# Patient Record
Sex: MALE | Race: WHITE | NOT HISPANIC OR LATINO | Employment: UNEMPLOYED | URBAN - METROPOLITAN AREA
[De-identification: names, ages, dates, MRNs, and addresses within clinical notes are randomized per-mention and may not be internally consistent; named-entity substitution may affect disease eponyms.]

---

## 2020-04-07 ENCOUNTER — HOSPITAL ENCOUNTER (EMERGENCY)
Facility: MEDICAL CENTER | Age: 50
End: 2020-04-09
Attending: EMERGENCY MEDICINE

## 2020-04-07 DIAGNOSIS — F22 PARANOIA (HCC): ICD-10-CM

## 2020-04-07 DIAGNOSIS — F22 DELUSIONS (HCC): ICD-10-CM

## 2020-04-07 LAB
ALBUMIN SERPL BCP-MCNC: 4.9 G/DL (ref 3.2–4.9)
ALBUMIN/GLOB SERPL: 1.5 G/DL
ALP SERPL-CCNC: 60 U/L (ref 30–99)
ALT SERPL-CCNC: 52 U/L (ref 2–50)
ANION GAP SERPL CALC-SCNC: 17 MMOL/L (ref 7–16)
AST SERPL-CCNC: 102 U/L (ref 12–45)
BASOPHILS # BLD AUTO: 0.5 % (ref 0–1.8)
BASOPHILS # BLD: 0.06 K/UL (ref 0–0.12)
BILIRUB SERPL-MCNC: 1 MG/DL (ref 0.1–1.5)
BUN SERPL-MCNC: 14 MG/DL (ref 8–22)
CALCIUM SERPL-MCNC: 9.2 MG/DL (ref 8.5–10.5)
CHLORIDE SERPL-SCNC: 97 MMOL/L (ref 96–112)
CO2 SERPL-SCNC: 22 MMOL/L (ref 20–33)
CREAT SERPL-MCNC: 1.06 MG/DL (ref 0.5–1.4)
EOSINOPHIL # BLD AUTO: 0.01 K/UL (ref 0–0.51)
EOSINOPHIL NFR BLD: 0.1 % (ref 0–6.9)
ERYTHROCYTE [DISTWIDTH] IN BLOOD BY AUTOMATED COUNT: 43.4 FL (ref 35.9–50)
ETHANOL BLD-MCNC: <10.1 MG/DL (ref 0–10.1)
GLOBULIN SER CALC-MCNC: 3.2 G/DL (ref 1.9–3.5)
GLUCOSE SERPL-MCNC: 122 MG/DL (ref 65–99)
HCT VFR BLD AUTO: 47.1 % (ref 42–52)
HGB BLD-MCNC: 16.7 G/DL (ref 14–18)
IMM GRANULOCYTES # BLD AUTO: 0.02 K/UL (ref 0–0.11)
IMM GRANULOCYTES NFR BLD AUTO: 0.2 % (ref 0–0.9)
LYMPHOCYTES # BLD AUTO: 2.13 K/UL (ref 1–4.8)
LYMPHOCYTES NFR BLD: 17.1 % (ref 22–41)
MCH RBC QN AUTO: 32.4 PG (ref 27–33)
MCHC RBC AUTO-ENTMCNC: 35.5 G/DL (ref 33.7–35.3)
MCV RBC AUTO: 91.3 FL (ref 81.4–97.8)
MONOCYTES # BLD AUTO: 1.16 K/UL (ref 0–0.85)
MONOCYTES NFR BLD AUTO: 9.3 % (ref 0–13.4)
NEUTROPHILS # BLD AUTO: 9.07 K/UL (ref 1.82–7.42)
NEUTROPHILS NFR BLD: 72.8 % (ref 44–72)
NRBC # BLD AUTO: 0 K/UL
NRBC BLD-RTO: 0 /100 WBC
PLATELET # BLD AUTO: 278 K/UL (ref 164–446)
PMV BLD AUTO: 9.1 FL (ref 9–12.9)
POC BREATHALIZER: 0 PERCENT (ref 0–0.01)
POTASSIUM SERPL-SCNC: 3 MMOL/L (ref 3.6–5.5)
PROT SERPL-MCNC: 8.1 G/DL (ref 6–8.2)
RBC # BLD AUTO: 5.16 M/UL (ref 4.7–6.1)
SODIUM SERPL-SCNC: 136 MMOL/L (ref 135–145)
WBC # BLD AUTO: 12.5 K/UL (ref 4.8–10.8)

## 2020-04-07 PROCEDURE — 80053 COMPREHEN METABOLIC PANEL: CPT

## 2020-04-07 PROCEDURE — 700111 HCHG RX REV CODE 636 W/ 250 OVERRIDE (IP): Performed by: EMERGENCY MEDICINE

## 2020-04-07 PROCEDURE — 700102 HCHG RX REV CODE 250 W/ 637 OVERRIDE(OP): Performed by: EMERGENCY MEDICINE

## 2020-04-07 PROCEDURE — 302970 POC BREATHALIZER: Performed by: EMERGENCY MEDICINE

## 2020-04-07 PROCEDURE — 80307 DRUG TEST PRSMV CHEM ANLYZR: CPT

## 2020-04-07 PROCEDURE — 99285 EMERGENCY DEPT VISIT HI MDM: CPT

## 2020-04-07 PROCEDURE — 85025 COMPLETE CBC W/AUTO DIFF WBC: CPT

## 2020-04-07 PROCEDURE — 96372 THER/PROPH/DIAG INJ SC/IM: CPT

## 2020-04-07 PROCEDURE — A9270 NON-COVERED ITEM OR SERVICE: HCPCS | Performed by: EMERGENCY MEDICINE

## 2020-04-07 RX ORDER — ZIPRASIDONE HYDROCHLORIDE 40 MG/1
40 CAPSULE ORAL ONCE
Status: DISCONTINUED | OUTPATIENT
Start: 2020-04-07 | End: 2020-04-08

## 2020-04-07 RX ORDER — DIPHENHYDRAMINE HYDROCHLORIDE 50 MG/ML
50 INJECTION INTRAMUSCULAR; INTRAVENOUS ONCE
Status: DISCONTINUED | OUTPATIENT
Start: 2020-04-07 | End: 2020-04-07

## 2020-04-07 RX ORDER — DIPHENHYDRAMINE HYDROCHLORIDE 50 MG/ML
50 INJECTION INTRAMUSCULAR; INTRAVENOUS ONCE
Status: COMPLETED | OUTPATIENT
Start: 2020-04-07 | End: 2020-04-07

## 2020-04-07 RX ORDER — TRAZODONE HYDROCHLORIDE 50 MG/1
50 TABLET ORAL ONCE
Status: COMPLETED | OUTPATIENT
Start: 2020-04-07 | End: 2020-04-07

## 2020-04-07 RX ORDER — HALOPERIDOL 5 MG/ML
5 INJECTION INTRAMUSCULAR ONCE
Status: COMPLETED | OUTPATIENT
Start: 2020-04-07 | End: 2020-04-07

## 2020-04-07 RX ORDER — POTASSIUM CHLORIDE 20 MEQ/1
40 TABLET, EXTENDED RELEASE ORAL ONCE
Status: COMPLETED | OUTPATIENT
Start: 2020-04-07 | End: 2020-04-08

## 2020-04-07 RX ORDER — LORAZEPAM 2 MG/ML
2 INJECTION INTRAMUSCULAR ONCE
Status: COMPLETED | OUTPATIENT
Start: 2020-04-07 | End: 2020-04-07

## 2020-04-07 RX ORDER — LORAZEPAM 2 MG/ML
2 INJECTION INTRAMUSCULAR ONCE
Status: DISCONTINUED | OUTPATIENT
Start: 2020-04-07 | End: 2020-04-07

## 2020-04-07 RX ORDER — LORAZEPAM 1 MG/1
1 TABLET ORAL ONCE
Status: DISCONTINUED | OUTPATIENT
Start: 2020-04-07 | End: 2020-04-08

## 2020-04-07 RX ADMIN — DIPHENHYDRAMINE HYDROCHLORIDE 50 MG: 50 INJECTION INTRAMUSCULAR; INTRAVENOUS at 21:38

## 2020-04-07 RX ADMIN — TRAZODONE HYDROCHLORIDE 50 MG: 50 TABLET ORAL at 20:04

## 2020-04-07 RX ADMIN — HALOPERIDOL LACTATE 5 MG: 5 INJECTION, SOLUTION INTRAMUSCULAR at 21:37

## 2020-04-07 RX ADMIN — LORAZEPAM 2 MG: 2 INJECTION INTRAMUSCULAR; INTRAVENOUS at 21:37

## 2020-04-08 LAB
AMPHET UR QL SCN: POSITIVE
BARBITURATES UR QL SCN: NEGATIVE
BENZODIAZ UR QL SCN: NEGATIVE
BZE UR QL SCN: NEGATIVE
CANNABINOIDS UR QL SCN: NEGATIVE
METHADONE UR QL SCN: NEGATIVE
OPIATES UR QL SCN: NEGATIVE
OXYCODONE UR QL SCN: NEGATIVE
PCP UR QL SCN: NEGATIVE
PROPOXYPH UR QL SCN: NEGATIVE

## 2020-04-08 PROCEDURE — 80307 DRUG TEST PRSMV CHEM ANLYZR: CPT

## 2020-04-08 PROCEDURE — 90791 PSYCH DIAGNOSTIC EVALUATION: CPT

## 2020-04-08 PROCEDURE — A9270 NON-COVERED ITEM OR SERVICE: HCPCS | Performed by: STUDENT IN AN ORGANIZED HEALTH CARE EDUCATION/TRAINING PROGRAM

## 2020-04-08 PROCEDURE — 90792 PSYCH DIAG EVAL W/MED SRVCS: CPT | Mod: GC | Performed by: PSYCHIATRY & NEUROLOGY

## 2020-04-08 PROCEDURE — 93005 ELECTROCARDIOGRAM TRACING: CPT | Performed by: PSYCHIATRY & NEUROLOGY

## 2020-04-08 PROCEDURE — A9270 NON-COVERED ITEM OR SERVICE: HCPCS | Performed by: EMERGENCY MEDICINE

## 2020-04-08 PROCEDURE — 700102 HCHG RX REV CODE 250 W/ 637 OVERRIDE(OP): Performed by: EMERGENCY MEDICINE

## 2020-04-08 PROCEDURE — 700102 HCHG RX REV CODE 250 W/ 637 OVERRIDE(OP): Performed by: STUDENT IN AN ORGANIZED HEALTH CARE EDUCATION/TRAINING PROGRAM

## 2020-04-08 RX ORDER — TRAZODONE HYDROCHLORIDE 50 MG/1
50 TABLET ORAL
Status: DISCONTINUED | OUTPATIENT
Start: 2020-04-08 | End: 2020-04-09 | Stop reason: HOSPADM

## 2020-04-08 RX ORDER — DIPHENHYDRAMINE HCL 25 MG
50 TABLET ORAL
Status: DISCONTINUED | OUTPATIENT
Start: 2020-04-08 | End: 2020-04-09 | Stop reason: HOSPADM

## 2020-04-08 RX ORDER — LORAZEPAM 2 MG/1
2 TABLET ORAL
Status: DISCONTINUED | OUTPATIENT
Start: 2020-04-08 | End: 2020-04-09 | Stop reason: HOSPADM

## 2020-04-08 RX ORDER — LORAZEPAM 2 MG/ML
2 INJECTION INTRAMUSCULAR
Status: DISCONTINUED | OUTPATIENT
Start: 2020-04-08 | End: 2020-04-09 | Stop reason: HOSPADM

## 2020-04-08 RX ORDER — HALOPERIDOL 5 MG/1
5 TABLET ORAL
Status: DISCONTINUED | OUTPATIENT
Start: 2020-04-08 | End: 2020-04-09 | Stop reason: HOSPADM

## 2020-04-08 RX ORDER — HALOPERIDOL 5 MG/ML
5 INJECTION INTRAMUSCULAR
Status: DISCONTINUED | OUTPATIENT
Start: 2020-04-08 | End: 2020-04-09 | Stop reason: HOSPADM

## 2020-04-08 RX ORDER — DIPHENHYDRAMINE HYDROCHLORIDE 50 MG/ML
50 INJECTION INTRAMUSCULAR; INTRAVENOUS
Status: DISCONTINUED | OUTPATIENT
Start: 2020-04-08 | End: 2020-04-09 | Stop reason: HOSPADM

## 2020-04-08 RX ORDER — RISPERIDONE 1 MG/1
0.5 TABLET ORAL 2 TIMES DAILY
Status: DISCONTINUED | OUTPATIENT
Start: 2020-04-08 | End: 2020-04-08

## 2020-04-08 RX ORDER — RISPERIDONE 1 MG/1
1 TABLET ORAL 2 TIMES DAILY
Status: DISCONTINUED | OUTPATIENT
Start: 2020-04-08 | End: 2020-04-09 | Stop reason: HOSPADM

## 2020-04-08 RX ADMIN — POTASSIUM CHLORIDE 40 MEQ: 1500 TABLET, EXTENDED RELEASE ORAL at 05:26

## 2020-04-08 RX ADMIN — TRAZODONE HYDROCHLORIDE 50 MG: 50 TABLET ORAL at 23:09

## 2020-04-08 NOTE — ED NOTES
Pt alert and oriented at this time.  He denies SI/HI.  He appears anxious and states he thinks someone is out to get him and is concerned about his safety. Pt asking for his door to be locked.  Pt continues to be on guard looking out of his door.  Pt medicated per MAR.  Safety maintained. Pt in close observation from nurses station.
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
## 2020-04-08 NOTE — ED NOTES
Urine specimen walked to lab.  Pt again standing at the door, no aggressive behavior noted at this time.

## 2020-04-08 NOTE — ED NOTES
Pt awake and orientated at this time.  Medicated per MAR.  Provided a urinal and instructed that we needed a sample.  No complaints at this time.

## 2020-04-08 NOTE — ED NOTES
Med rec updated and complete  Allergies reviewed  Per RN, pt just left longterm on 4/1/2020 reports that he was getting TRAZODONE 50MG, but did not get a prescription when he left longterm.  Pt reports no vitamins or OTC's.  Pt reports no antibiotics in the last 2 weeks  Pt is not sure what pharmacy to go too.

## 2020-04-08 NOTE — DISCHARGE PLANNING
Medical Social Work    Referral: Legal Hold    Intervention: Legal Hold Paperwork given to SW by Life Skills RN Ene Jennings    Legal Hold Initiated: Date: 04-07-20 Time: 1830    Patient’s Insurance Listed on Face Sheet: None    Referrals sent to: [facility]    This referral contains the following information:  1) Face sheet _x___  2) Page 1 and Page 2 of Legal Hold _x___  3) Alert Team Assessment/Psych Assessment ___x_  4) Head to toe physical exam ___x_  5) Urine Drug Screen __x__  6) Blood Alcohol __x__  7) Vital signs __x__  8) Pregnancy test when applicable __NA_  9) Medications list __x__    Plan: Patient will transfer to mental health facility once acceptance is obtained
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
## 2020-04-08 NOTE — ED NOTES
Pt continues to stand at door, anxious.  He starting hitting the door, refusing PO medication. ERP aware.  Unable to redirect.  Continues to have hallucinations, stating someone is out to get him and he feels unsafe.  Security at beside. Po medication changed to IM per ERP. Medication administered per MAR.  Pt redirected.

## 2020-04-08 NOTE — ED NOTES
Patient sleeping at this time. Equal chest rise and fall noted. Covered with a blanket.  Side rails remain up at this time

## 2020-04-08 NOTE — ED NOTES
Patient rounded on and visualized.  Respirations are even an unlabored. Patient repositions self as needed

## 2020-04-08 NOTE — DISCHARGE PLANNING
Medical Social Work    MSW received a copy of pt's legal hold from Alert Team.  Unable to send mental health referrals until pt is registered, Alert Team note is available and pt's UDS is resulted.

## 2020-04-08 NOTE — ED NOTES
Patient's home medications have been reviewed by the pharmacy team.     Past Medical History:   Diagnosis Date   • Depression    • Psychiatric disorder        Patient's Medications    No medications on file        A/P:  Patient denies taking any medications. None have been ordered at this time. Defer to psychiatry.      Colton Morrow, PharmD, BCPS

## 2020-04-08 NOTE — ED PROVIDER NOTES
ED Provider Note    Patient received in signout from Dr. Rosa at 6:20 AM    Briefly, pt is 49-year-old male presenting with psychotic behavior and delusions.  He has been medically cleared and is pending transfer to inpatient psychiatric facility      Patient has been comfortable and without complaint to my shift, will transfer care to Dr. Pennington
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
## 2020-04-08 NOTE — ED PROVIDER NOTES
ED Provider Note    Chief Complaint:   Delusional thoughts    HPI:  Mauricio Cruz is a 49 y.o. male who presents brought by [employer] police out of concern for psychotic behavior and delusions.  He was found wandering in the middle of the street.  He states that he was in the middle of the street to avoid a large group of Mexicans were trying to get him.  He states he was discharged from jail 3 days ago, and was treated with trazodone while in jail for depression.  He denies any other psychiatric history.  He reports that he was going home in a taxi, but when the taxi got there there was a model of [phone] people outside of his house, these of the same people who had been chasing him all day.  He continues to ask me, as well as nursing staff to put him in a room with a locked door.  This is despite us explaining that the hospital is entirely under lockdown at this time due to COVID-19, and that no one except patients are allowed into the emergency department.  He does have some rambling speech, does not appear to be responding to internal stimuli.  He reports no other significant past medical history, denies headaches, fevers, nausea vomiting, chest pain, or shortness of breath.    Review of Systems:  See HPI for pertinent positives and negatives. All other systems negative.    Past Medical History:   has a past medical history of Depression and Psychiatric disorder.    Social History:  Social History     Tobacco Use   • Smoking status: Never Smoker   • Smokeless tobacco: Never Used   Substance and Sexual Activity   • Alcohol use: Not Currently   • Drug use: Not Currently   • Sexual activity: Not on file       Surgical History:  patient denies any surgical history    Current Medications:  Home Medications    **Home medications have not yet been reviewed for this encounter**         Allergies:  No Known Allergies    Physical Exam:  Vital Signs: /72   Pulse 90   Temp 36.7 °C (98.1 °F) (Oral)   Resp 18   Ht 1.753  "m (5' 9\")   Wt 88.5 kg (195 lb)   SpO2 94%   BMI 28.80 kg/m²   Constitutional: Alert, no acute distress  HENT: Atraumatic, normocephalic  Eyes: Normal conjunctiva, no scleral icterus   Neck: Supple, normal range of motion  Cardiovascular: Extremities are warm and well perfused  Pulmonary: No respiratory distress, normal work of breathing  Abdomen: Non-distended  Skin: Warm, dry, no rashes or lesions  Musculoskeletal: No extremity deformity noted  Neurologic: Alert, awake, no slurred speech, no facial droop, normal gait, normal movements  Psychiatric: Overall appearance well-groomed with good hygiene, normal orientation, affect flat, thought process is tangential, hallucinations absent, speech is flat and steady, no pressured speech, poor insight, poor judgement, he is having active delusions and is concerned that the mob of people chasing him are going to break into the emergency department and harm him.    Medical records reviewed for continuity of care.  No prior medical records available for review    Labs:  Labs Reviewed   CBC WITH DIFFERENTIAL - Abnormal; Notable for the following components:       Result Value    WBC 12.5 (*)     MCHC 35.5 (*)     Neutrophils-Polys 72.80 (*)     Lymphocytes 17.10 (*)     Neutrophils (Absolute) 9.07 (*)     Monos (Absolute) 1.16 (*)     All other components within normal limits   COMP METABOLIC PANEL - Abnormal; Notable for the following components:    Potassium 3.0 (*)     Anion Gap 17.0 (*)     Glucose 122 (*)     AST(SGOT) 102 (*)     ALT(SGPT) 52 (*)     All other components within normal limits   POC BREATHALIZER - Normal   DIAGNOSTIC ALCOHOL   ESTIMATED GFR   URINE DRUG SCREEN       Radiology:  No orders to display        ED Medications Administered:  Medications   ziprasidone (GEODON) capsule 40 mg ( Oral Canceled Entry 4/7/20 2130)   LORazepam (ATIVAN) tablet 1 mg ( Oral Canceled Entry 4/7/20 2130)   potassium chloride SA (Kdur) tablet 40 mEq (has no administration " in time range)   traZODone (DESYREL) tablet 50 mg (50 mg Oral Given 4/7/20 2004)   haloperidol lactate (HALDOL) injection 5 mg (5 mg Intramuscular Given 4/7/20 2137)   LORazepam (ATIVAN) injection 2 mg (2 mg Intramuscular Given 4/7/20 2137)   diphenhydrAMINE (BENADRYL) injection 50 mg (50 mg Intramuscular Given 4/7/20 2138)       Differential diagnosis:  Psychotic break, substance-induced mood disorder, other psychiatric illness    MDM:  Patient presents brought by police on a legal hold for bizarre behavior and delusions.  He remains delusional in the emergency department, and is very concerned and angry mom is going to harm him in the emergency department.    Breathalyzer was 0.  He was given his home dose of trazodone, but did not have any improvement in his behavior.  Oral Ativan and Geodon was offered, however he refused.  His agitation escalated, we were not able to redirect him.  He then became violent punching the walls in the room.  He was given intramuscular Haldol, Ativan, and Benadryl as calming agents.  He was then resting comfortably after these medications take effect.    On laboratory evaluation White blood count is mildly elevated, patient has no fever, I am less concerned for infectious etiology, this is likely reactive due to his agitation.  CMP with no significant abnormalities, potassium is slightly low.  Oral potassium ordered.  Diagnostic alcohol level is negative.  Urine drug screen pending at this time.    Plan at this time is for alert team for psychiatry evaluation when the patient wakes and is cooperative.  Care will be turned over to oncoming physician.  Medical clearance and attestation completed.    This patient was not identified to have risk factors for COVID-19.  Personal protective equipment including N95 surgical respirator, goggles, and gloves were used during this encounter.     Disposition:  Anticipate transfer to behavioral health facility    Final Impression:  1. Delusions  (HCC)    2. Jimmy ([facility])        Electronically signed by: Bre Ruiz M.D., 4/7/2020 11:54 PM

## 2020-04-09 VITALS
DIASTOLIC BLOOD PRESSURE: 86 MMHG | OXYGEN SATURATION: 94 % | BODY MASS INDEX: 28.88 KG/M2 | SYSTOLIC BLOOD PRESSURE: 126 MMHG | HEIGHT: 69 IN | HEART RATE: 99 BPM | RESPIRATION RATE: 16 BRPM | TEMPERATURE: 97.9 F | WEIGHT: 195 LBS

## 2020-04-09 LAB — EKG IMPRESSION: NORMAL

## 2020-04-09 PROCEDURE — 99285 EMERGENCY DEPT VISIT HI MDM: CPT

## 2020-04-09 PROCEDURE — 99282 EMERGENCY DEPT VISIT SF MDM: CPT | Mod: GC | Performed by: PSYCHIATRY & NEUROLOGY

## 2020-04-09 PROCEDURE — 96372 THER/PROPH/DIAG INJ SC/IM: CPT

## 2020-04-09 RX ORDER — LORAZEPAM 2 MG/ML
2 INJECTION INTRAMUSCULAR ONCE
Status: DISCONTINUED | OUTPATIENT
Start: 2020-04-09 | End: 2020-04-09 | Stop reason: HOSPADM

## 2020-04-09 RX ORDER — TRAZODONE HYDROCHLORIDE 50 MG/1
50 TABLET ORAL
Qty: 15 TAB | Refills: 0 | Status: SHIPPED | OUTPATIENT
Start: 2020-04-09

## 2020-04-09 ASSESSMENT — ENCOUNTER SYMPTOMS
HALLUCINATIONS: 0
SPUTUM PRODUCTION: 0
SHORTNESS OF BREATH: 0
BLOOD IN STOOL: 0
PALPITATIONS: 0
SPEECH CHANGE: 0
DIAPHORESIS: 0
INSOMNIA: 0
NECK PAIN: 0
TREMORS: 0
MEMORY LOSS: 0
LOSS OF CONSCIOUSNESS: 0
EYE PAIN: 0
VOMITING: 0
CHILLS: 0
DOUBLE VISION: 0
HEARTBURN: 0
MYALGIAS: 0
BACK PAIN: 0
HEMOPTYSIS: 0
SENSORY CHANGE: 0
PHOTOPHOBIA: 0
NERVOUS/ANXIOUS: 0
COUGH: 0
DEPRESSION: 1
WEIGHT LOSS: 0
NAUSEA: 0
PND: 0
STRIDOR: 0
FEVER: 0
FOCAL WEAKNESS: 0
SINUS PAIN: 0
SEIZURES: 0
EYE DISCHARGE: 0

## 2020-04-09 ASSESSMENT — LIFESTYLE VARIABLES: SUBSTANCE_ABUSE: 0

## 2020-04-09 NOTE — ED NOTES
Rounded on Pt. Pt standing in door. Pt endorses paranoid thoughts at this time. Denies needs at this time. Sitter in place and checklist in use.
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
## 2020-04-09 NOTE — ED NOTES
Discharge instructions given and explained including Rx. All belongings and valuables from safekeeping given to pt.

## 2020-04-09 NOTE — ED PROVIDER NOTES
ED Provider Note    Patient was evaluated by psychiatry and they have been released from the legal hold.  They have been provided resources.  The patient will be discharged to their own care

## 2020-04-09 NOTE — ED PROVIDER NOTES
ED Provider Note    5:59 AM Patient reevaluated.  Patient is on a legal hold, waiting transfer to psychiatric facility when a bed is available.  Please refer to initial note for complete details.  No concerns overnight, patient continues to be somewhat paranoid, has not slept much despite medications but is redirectable and calm.  Otherwise, patient ambulates to the bathroom independently and tolerated a meal.  Medication reconciliation has been reviewed.

## 2020-04-09 NOTE — DISCHARGE PLANNING
ALERT team  note:  49 year old male admitted 4/7/2020, legal hold, inability to care for self, UDS + amphetamines; on active insurance plan; pt evaluated by [facility] ED psychiatry team, legal hold DC'd; no SI, HI, or self-harm ideation noted; with organized thoughts and behaviors today; writer RN reviewed community MH and CD resources with pt, with written information given, including [facility], [facility], [facility]; pt verbalized understanding; pt to DC to self today

## 2020-04-09 NOTE — DISCHARGE PLANNING
Pt standing in his doorway much of the night staring and acting somewhat paranoid. He was compliant and cooperative however.

## 2020-04-10 ENCOUNTER — HOSPITAL ENCOUNTER (EMERGENCY)
Facility: MEDICAL CENTER | Age: 50
End: 2020-04-11
Attending: EMERGENCY MEDICINE

## 2020-04-10 ENCOUNTER — APPOINTMENT (OUTPATIENT)
Dept: RADIOLOGY | Facility: MEDICAL CENTER | Age: 50
End: 2020-04-10
Attending: EMERGENCY MEDICINE

## 2020-04-10 VITALS
TEMPERATURE: 98.5 F | RESPIRATION RATE: 17 BRPM | WEIGHT: 195.11 LBS | BODY MASS INDEX: 27.31 KG/M2 | HEART RATE: 92 BPM | HEIGHT: 71 IN | SYSTOLIC BLOOD PRESSURE: 123 MMHG | DIASTOLIC BLOOD PRESSURE: 66 MMHG | OXYGEN SATURATION: 94 %

## 2020-04-10 DIAGNOSIS — S83.8X2A SPRAIN OF OTHER LIGAMENT OF LEFT KNEE, INITIAL ENCOUNTER: ICD-10-CM

## 2020-04-10 DIAGNOSIS — S93.402A SPRAIN OF LEFT ANKLE, UNSPECIFIED LIGAMENT, INITIAL ENCOUNTER: ICD-10-CM

## 2020-04-10 LAB
AMPHET UR QL SCN: POSITIVE
ANION GAP SERPL CALC-SCNC: 15 MMOL/L (ref 7–16)
BARBITURATES UR QL SCN: NEGATIVE
BASOPHILS # BLD AUTO: 0.5 % (ref 0–1.8)
BASOPHILS # BLD: 0.05 K/UL (ref 0–0.12)
BENZODIAZ UR QL SCN: NEGATIVE
BUN SERPL-MCNC: 17 MG/DL (ref 8–22)
BZE UR QL SCN: NEGATIVE
CALCIUM SERPL-MCNC: 9 MG/DL (ref 8.5–10.5)
CANNABINOIDS UR QL SCN: NEGATIVE
CHLORIDE SERPL-SCNC: 102 MMOL/L (ref 96–112)
CO2 SERPL-SCNC: 23 MMOL/L (ref 20–33)
CREAT SERPL-MCNC: 1.06 MG/DL (ref 0.5–1.4)
EOSINOPHIL # BLD AUTO: 0.02 K/UL (ref 0–0.51)
EOSINOPHIL NFR BLD: 0.2 % (ref 0–6.9)
ERYTHROCYTE [DISTWIDTH] IN BLOOD BY AUTOMATED COUNT: 43.4 FL (ref 35.9–50)
GLUCOSE SERPL-MCNC: 118 MG/DL (ref 65–99)
HCT VFR BLD AUTO: 43.6 % (ref 42–52)
HGB BLD-MCNC: 15.2 G/DL (ref 14–18)
IMM GRANULOCYTES # BLD AUTO: 0.03 K/UL (ref 0–0.11)
IMM GRANULOCYTES NFR BLD AUTO: 0.3 % (ref 0–0.9)
LYMPHOCYTES # BLD AUTO: 1.67 K/UL (ref 1–4.8)
LYMPHOCYTES NFR BLD: 17.1 % (ref 22–41)
MCH RBC QN AUTO: 32.2 PG (ref 27–33)
MCHC RBC AUTO-ENTMCNC: 34.9 G/DL (ref 33.7–35.3)
MCV RBC AUTO: 92.4 FL (ref 81.4–97.8)
METHADONE UR QL SCN: NEGATIVE
MONOCYTES # BLD AUTO: 1.34 K/UL (ref 0–0.85)
MONOCYTES NFR BLD AUTO: 13.7 % (ref 0–13.4)
NEUTROPHILS # BLD AUTO: 6.66 K/UL (ref 1.82–7.42)
NEUTROPHILS NFR BLD: 68.2 % (ref 44–72)
NRBC # BLD AUTO: 0 K/UL
NRBC BLD-RTO: 0 /100 WBC
OPIATES UR QL SCN: NEGATIVE
OXYCODONE UR QL SCN: NEGATIVE
PCP UR QL SCN: NEGATIVE
PLATELET # BLD AUTO: 274 K/UL (ref 164–446)
PMV BLD AUTO: 9 FL (ref 9–12.9)
POC BREATHALIZER: 0 PERCENT (ref 0–0.01)
POTASSIUM SERPL-SCNC: 3.3 MMOL/L (ref 3.6–5.5)
PROPOXYPH UR QL SCN: NEGATIVE
RBC # BLD AUTO: 4.72 M/UL (ref 4.7–6.1)
SODIUM SERPL-SCNC: 140 MMOL/L (ref 135–145)
WBC # BLD AUTO: 9.8 K/UL (ref 4.8–10.8)

## 2020-04-10 PROCEDURE — 99285 EMERGENCY DEPT VISIT HI MDM: CPT

## 2020-04-10 PROCEDURE — 80307 DRUG TEST PRSMV CHEM ANLYZR: CPT

## 2020-04-10 PROCEDURE — 73562 X-RAY EXAM OF KNEE 3: CPT | Mod: LT

## 2020-04-10 PROCEDURE — 73562 X-RAY EXAM OF KNEE 3: CPT | Mod: RT

## 2020-04-10 PROCEDURE — 302970 POC BREATHALIZER: Performed by: EMERGENCY MEDICINE

## 2020-04-10 PROCEDURE — 73610 X-RAY EXAM OF ANKLE: CPT | Mod: RT

## 2020-04-10 PROCEDURE — 80048 BASIC METABOLIC PNL TOTAL CA: CPT

## 2020-04-10 PROCEDURE — 73610 X-RAY EXAM OF ANKLE: CPT | Mod: LT

## 2020-04-10 PROCEDURE — 85025 COMPLETE CBC W/AUTO DIFF WBC: CPT

## 2020-04-10 PROCEDURE — 700117 HCHG RX CONTRAST REV CODE 255: Performed by: EMERGENCY MEDICINE

## 2020-04-10 PROCEDURE — 73706 CT ANGIO LWR EXTR W/O&W/DYE: CPT | Mod: LT

## 2020-04-10 RX ADMIN — IOHEXOL 100 ML: 350 INJECTION, SOLUTION INTRAVENOUS at 22:10

## 2020-04-10 ASSESSMENT — LIFESTYLE VARIABLES
DOES PATIENT WANT TO STOP DRINKING: NO
EVER FELT BAD OR GUILTY ABOUT YOUR DRINKING: NO
TOTAL SCORE: 0
HAVE PEOPLE ANNOYED YOU BY CRITICIZING YOUR DRINKING: NO
EVER HAD A DRINK FIRST THING IN THE MORNING TO STEADY YOUR NERVES TO GET RID OF A HANGOVER: NO
CONSUMPTION TOTAL: INCOMPLETE
TOTAL SCORE: 0
TOTAL SCORE: 0
HAVE YOU EVER FELT YOU SHOULD CUT DOWN ON YOUR DRINKING: NO
DO YOU DRINK ALCOHOL: NO

## 2020-04-10 ASSESSMENT — FIBROSIS 4 INDEX: FIB4 SCORE: 2.59
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
## 2020-04-11 NOTE — ED PROVIDER NOTES
"ED Provider Note    Scribed for Katiuska Oliva M.D. by Nikunj Georges. 4/10/2020  5:45 PM    Primary care provider: None noted  Means of arrival: EMS  History obtained from: Patient  History limited by: None    CHIEF COMPLAINT  Chief Complaint   Patient presents with   • Knee Pain   • Psych Eval       HPI  Mauricio Cruz is a 49 y.o. male who presents for evaluation of a left knee injury. He states that he was walking when he stepped off a curb with his left knee, which then buckled and hyperextended inward, causing him to fall down and roll his left ankle. He did not his head or lose consciousness at the time of the fall. This injury occurred approximately 1.5 hours ago. Patient states that he was unable to walk after the injury and had significant swelling to the knee. EMS was called who brought him here for evaluation. Patient denies having any numbness or tingling to the left lower extremity, however continues to have left knee and left ankle pain. He denies having any left hip pain, right ankle pain, right knee pain or right hip pain.  Patient was just discharged from the hospital yesterday after being here for psychosis.  When the [place] [employer] arrived on scene today, the patient said that he was being \"chased by people.\"  [employer] says that patient was not being chased by anyone.  Patient uses methamphetamine.  He denies any suicidal or homicidal ideation.  At this time he says he does not or voices and he \"is not having any mental problem.\"  He complains of his feet hurting because of all the walking that he has had to do at work.  He says he works out at [employer] and reports that he was working today.    REVIEW OF SYSTEMS  Pertinent positives: fall, left knee injury, left left ankle injury  Pertinent negatives: No head injury, loss of consciousness, left hip pain, right ankle pain, right knee pain, right hip pain  See HPI for further details. All other systems are negative.    PAST MEDICAL " HISTORY  Past Medical History:   Diagnosis Date   • Depression    • Psychiatric disorder        FAMILY HISTORY  History reviewed. No pertinent family history.    SOCIAL HISTORY  Social History     Socioeconomic History   • Marital status: Single     Spouse name: Not on file   • Number of children: Not on file   • Years of education: Not on file   • Highest education level: Not on file   Occupational History   • Not on file   Social Needs   • Financial resource strain: Not on file   • Food insecurity     Worry: Not on file     Inability: Not on file   • Transportation needs     Medical: Not on file     Non-medical: Not on file   Tobacco Use   • Smoking status: Current Some Day Smoker   • Smokeless tobacco: Never Used   Substance and Sexual Activity   • Alcohol use: Not Currently   • Drug use: Yes     Comment: meth   • Sexual activity: Not on file   Lifestyle   • Physical activity     Days per week: Not on file     Minutes per session: Not on file   • Stress: Not on file   Relationships   • Social connections     Talks on phone: Not on file     Gets together: Not on file     Attends [employer] service: Not on file     Active member of club or organization: Not on file     Attends meetings of clubs or organizations: Not on file     Relationship status: Not on file   • Intimate partner violence     Fear of current or ex partner: Not on file     Emotionally abused: Not on file     Physically abused: Not on file     Forced sexual activity: Not on file   Other Topics Concern   • Not on file   Social History Narrative   • Not on file       SURGICAL HISTORY  History reviewed. No pertinent surgical history.    CURRENT MEDICATIONS  No current facility-administered medications on file prior to encounter.      Current Outpatient Medications on File Prior to Encounter   Medication Sig Dispense Refill   • traZODone (DESYREL) 50 MG Tab Take 1 Tab by mouth every bedtime. 15 Tab 0       ALLERGIES  Allergies   Allergen Reactions   •  "Ibuprofen Hives       PHYSICAL EXAM  VITAL SIGNS: /67   Pulse (!) 108   Temp 36.9 °C (98.5 °F) (Temporal)   Resp 16   Ht 1.803 m (5' 11\")   Wt 88.5 kg (195 lb 1.7 oz)   SpO2 94%   BMI 27.21 kg/m²      Constitutional: Well developed, well nourished, unkempt, fidgety  HENT: Normocephalic, atraumatic; Bilateral external ears normal   Eyes: PERRL, EOMI, Conjunctiva normal. No discharge.   Neck:  Supple, full range of motion  Thorax & Lungs: No respiratory distress  Skin: Good color; warm and dry without rash or petechia.  Extremities: Contusion to bilateral knees left greater than right, mild swelling to the left knee and left ankle, tender diffusely over the left knee, as well as the medial and anterior aspect of the left ankle, sensation intact to the light touch, Full range of motion of digits, a few blisters to the plantar surfaces of bilateral feet.  Neurologic: Alert & oriented x 4, clear speech,    LABS/RADIOLOGY/PROCEDURES  Results for orders placed or performed during the hospital encounter of 04/10/20   URINE DRUG SCREEN   Result Value Ref Range    Amphetamines Urine Positive (A) Negative    Barbiturates Negative Negative    Benzodiazepines Negative Negative    Cocaine Metabolite Negative Negative    Methadone Negative Negative    Opiates Negative Negative    Oxycodone Negative Negative    Phencyclidine -Pcp Negative Negative    Propoxyphene Negative Negative    Cannabinoid Metab Negative Negative   CBC WITH DIFFERENTIAL   Result Value Ref Range    WBC 9.8 4.8 - 10.8 K/uL    RBC 4.72 4.70 - 6.10 M/uL    Hemoglobin 15.2 14.0 - 18.0 g/dL    Hematocrit 43.6 42.0 - 52.0 %    MCV 92.4 81.4 - 97.8 fL    MCH 32.2 27.0 - 33.0 pg    MCHC 34.9 33.7 - 35.3 g/dL    RDW 43.4 35.9 - 50.0 fL    Platelet Count 274 164 - 446 K/uL    MPV 9.0 9.0 - 12.9 fL    Neutrophils-Polys 68.20 44.00 - 72.00 %    Lymphocytes 17.10 (L) 22.00 - 41.00 %    Monocytes 13.70 (H) 0.00 - 13.40 %    Eosinophils 0.20 0.00 - 6.90 %    " Basophils 0.50 0.00 - 1.80 %    Immature Granulocytes 0.30 0.00 - 0.90 %    Nucleated RBC 0.00 /100 WBC    Neutrophils (Absolute) 6.66 1.82 - 7.42 K/uL    Lymphs (Absolute) 1.67 1.00 - 4.80 K/uL    Monos (Absolute) 1.34 (H) 0.00 - 0.85 K/uL    Eos (Absolute) 0.02 0.00 - 0.51 K/uL    Baso (Absolute) 0.05 0.00 - 0.12 K/uL    Immature Granulocytes (abs) 0.03 0.00 - 0.11 K/uL    NRBC (Absolute) 0.00 K/uL   BASIC METABOLIC PANEL   Result Value Ref Range    Sodium 140 135 - 145 mmol/L    Potassium 3.3 (L) 3.6 - 5.5 mmol/L    Chloride 102 96 - 112 mmol/L    Co2 23 20 - 33 mmol/L    Glucose 118 (H) 65 - 99 mg/dL    Bun 17 8 - 22 mg/dL    Creatinine 1.06 0.50 - 1.40 mg/dL    Calcium 9.0 8.5 - 10.5 mg/dL    Anion Gap 15.0 7.0 - 16.0   ESTIMATED GFR   Result Value Ref Range    GFR If African American >60 >60 mL/min/1.73 m 2    GFR If Non African American >60 >60 mL/min/1.73 m 2   POC BREATHALIZER   Result Value Ref Range    POC Breathalizer 0.00 0.00 - 0.01 Percent       CT-CTA LOWER EXT WITH & W/O-POST PROCESS LEFT   Final Result            No acute fracture or dislocation.      Small knee joint effusion.      Intact popliteal artery.      DX-KNEE 3 VIEWS RIGHT   Final Result      No evidence of fracture or dislocation.   Small joint effusion is identified.      DX-ANKLE 3+ VIEWS RIGHT   Final Result      No evidence of fracture or dislocation.      DX-ANKLE 3+ VIEWS LEFT   Final Result      No evidence of fracture or dislocation.      DX-KNEE 3 VIEWS LEFT   Final Result      No evidence of fracture or dislocation.   Small joint effusion is identified.          COURSE & MEDICAL DECISION MAKING  Pertinent Labs & Imaging studies reviewed. (See chart for details)    5:45 PM - Patient seen and examined at bedside. Discussed plan of care, including imaging of his left knee as well as checking his blood work. Ordered for POC Breathalyzer, Urine drug screen, DX-Ankle + views right, Dx-Knee 3 views left, DX-Knee 3 views right,  "DX-Ankle +3 views left, CBC with differential, BMP, Urine drug screen, POC Breathalyzer to evaluate his symptoms.    2000: I discussed the case with Dr. Diaz, orthopedic surgeon on-call.  He is reviewed the patient's x-rays.  He thinks it is very unlikely that patient dislocated his knee, but given his odd history, he says it is not unreasonable to pursue further evaluation with CT angios of the leg to evaluate the popliteal artery and be sure that there is no vascular issue that might suggest a dislocation given patient's history.      Patient presents to the ER complaining of left knee pain after he says it twisted after he stepped off of a curb today while \"being chased by people.\"  [employer] police showed up on scene.  They said that nobody was actually chasing the patient.  Patient uses methamphetamine.  He has a history of psychosis.  In fact, he was just discharged from the hospital yesterday after being here for several days for psychosis.  He was given ample resources yesterday before his discharge.  He says he is not having any \"mental problems\" today.  He denies hearing voices today.  He denies suicidal ideation and homicidal ideation.  Initially complained of left knee pain.  As I was talking to him he also told me his right knee was hurting.  He also told me both of his ankles are hurting.  X-rays of bilateral knees and ankles are negative.  Patient tells me he has history of left knee dislocation in the past.  He says that it was not a kneecap dislocation it was a full dislocation of his knee.  He says this happened 6 years ago.  He tells me he thinks he dislocated his knee again today because \"this part of my leg would 1 direction and the other part of my leg went the other direction.\"  As a result I went ahead and did a CT angios of his leg.  He has good pulses.  CT angios negative and does not reveal any injury to the popliteal artery.  I went into evaluate the patient and he sleeping comfortably.  I " told him we would put him in a knee immobilizer and put him on crutches.  He is to follow-up with orthopedics.  At this time his main complaint were sore feet from having to walk a lot.  Patient will be discharged home.  His vital signs are normal and stable.  He needs to stop using methamphetamine.  He is not suicidal or homicidal.  Vitals are normal and stable.  He has been given strict return precautions and discharge instructions and understands treatment plan and follow-up.      I verified that the patient was wearing a mask and I was wearing appropriate PPE including N 95 mask, eye protection and gloves every time I entered the room. The patient's mask was on the patient at all times during my encounter except for a brief view of the oropharynx.    FINAL IMPRESSION  1. Sprain of other ligament of left knee, initial encounter Acute   2. Sprain of left ankle, unspecified ligament, initial encounter Acute        This dictation has been created using voice recognition software. The accuracy of the dictation is limited by the abilities of the software. I expect there may be some errors of grammar and possibly content. I made every attempt to manually correct the errors within my dictation. However, errors related to voice recognition software may still exist and should be interpreted within the appropriate context.     Nikunj JUAREZ (Scribe), am scribing for, and in the presence of, Katiuska Oliva M.D..    Electronically signed by: Nikunj Georges (Scribe), 4/10/2020    Katiuska JUAREZ M.D. personally performed the services described in this documentation, as scribed by Nikunj Georges in my presence, and it is both accurate and complete. C.    The note accurately reflects work and decisions made by me.  Katiuska Oliva M.D.  4/10/2020  11:27 PM

## 2020-04-11 NOTE — ED NOTES
Knee immobilizer and crutch education performed on the pt by rachel Thorpe. The pt demonstrates understanding on how to use the devices properly.

## 2020-04-11 NOTE — DISCHARGE INSTRUCTIONS
Follow-up with Dr. Diaz, orthopedist, early this coming week.  Please call Monday morning to schedule your appointment.    Wear your nebulizer and use crutches until you are seen by the orthopedist.    Use ice to help with the pain.    Return to the ER for any worsening knee pain, worsening knee swelling, numbness or tingling into your feet, redness or warmth overlying the knee, or for any concerns.

## 2020-04-11 NOTE — ED NOTES
Psych team gave patient resources yesterday. They said he should have an abundance of resources at home.

## 2020-04-11 NOTE — ED NOTES
Pt provided water upon request after returning from imaging. Pt is calm/cooperative at this time. Will continue to monitor.

## 2020-04-11 NOTE — ED NOTES
Pt reports that he has no way to get home, no wallet and does not qualify for MTM. , Xiomara informed and reports she will provide the pt with a taxi voucher this time. Pt is updated on plan.

## 2020-04-11 NOTE — ED NOTES
MD at bedside prior to discharge. Pt given discharge instructions and verbalized understanding, all questions are answered, signed copy in chart. PIV removed and dressed. Pt ambulatory to lobby, gait steady, discharged to self. Pt took all belongings from room.